# Patient Record
Sex: FEMALE | Race: WHITE | NOT HISPANIC OR LATINO | Employment: FULL TIME | URBAN - METROPOLITAN AREA
[De-identification: names, ages, dates, MRNs, and addresses within clinical notes are randomized per-mention and may not be internally consistent; named-entity substitution may affect disease eponyms.]

---

## 2024-07-19 ENCOUNTER — TELEPHONE (OUTPATIENT)
Dept: FAMILY MEDICINE | Facility: CLINIC | Age: 19
End: 2024-07-19
Payer: COMMERCIAL

## 2024-07-19 NOTE — TELEPHONE ENCOUNTER
Pt had had a toenail removed back December, the nail is growing back and is again ingrown and is looking infected on the right side of nail, she was asking if urgent care could address this. Gave her the name of the [place] urgent care and advised she set up with a PCP and also seek referral from them for podiatry if needed. She agreed    Alondra Oswald RN   [facility]

## 2024-08-10 ENCOUNTER — OFFICE VISIT (OUTPATIENT)
Dept: URGENT CARE | Facility: URGENT CARE | Age: 19
End: 2024-08-10
Payer: COMMERCIAL

## 2024-08-10 VITALS
SYSTOLIC BLOOD PRESSURE: 120 MMHG | TEMPERATURE: 98.1 F | RESPIRATION RATE: 16 BRPM | DIASTOLIC BLOOD PRESSURE: 68 MMHG | OXYGEN SATURATION: 100 % | HEART RATE: 69 BPM | WEIGHT: 139.4 LBS

## 2024-08-10 DIAGNOSIS — L60.0 INGROWN TOENAIL OF RIGHT FOOT: Primary | ICD-10-CM

## 2024-08-10 PROCEDURE — 99203 OFFICE O/P NEW LOW 30 MIN: CPT | Performed by: FAMILY MEDICINE

## 2024-08-10 RX ORDER — TRETINOIN 0.25 MG/G
CREAM TOPICAL
COMMUNITY
Start: 2023-06-02

## 2024-08-10 RX ORDER — CEPHALEXIN 500 MG/1
500 CAPSULE ORAL 3 TIMES DAILY
Qty: 21 CAPSULE | Refills: 0 | Status: SHIPPED | OUTPATIENT
Start: 2024-08-10 | End: 2024-08-17

## 2024-08-10 RX ORDER — ESCITALOPRAM OXALATE 20 MG/1
20 TABLET ORAL
COMMUNITY
Start: 2024-06-18 | End: 2024-09-19

## 2024-08-10 NOTE — PATIENT INSTRUCTIONS
Continue the Epsom foot soaks.      Take Tylenol for some of the right big toe pain.      Follow up with a podiatrist for further evaluation and treatment.

## 2024-08-10 NOTE — PROGRESS NOTES
SUBJECTIVE:   Hoa Frost is a 18 year old female presenting with a chief complaint of one month of redness, tenderness, yellowish watery oozing at the lateral and distal aspects of the right big toe.    Onset of symptoms was one month ago.  No new footwear. No recent injuries to the right big toe.  .    Current and Associated symptoms: No fevers. No spreading red streaks.    Treatment measures tried include Epsom salt soaks.  She also has applied Triple Antibiotic cream.  ..    Past Medical History:    Patient had an ingrown toenail on the medial side of the right big toe.      Current Outpatient Medications   Medication Sig Dispense Refill    escitalopram (LEXAPRO) 20 MG tablet Take 20 mg by mouth      levonorgestrel (MIRENA) 52 MG (20 mcg/day) IUD by Intrauterine route once      tretinoin (RETIN-A) 0.025 % external cream Apply pea sized amount every 3rd night, then increase to nightly as tolerated.       Social History     Tobacco Use    Smoking status: Not on file    Smokeless tobacco: Not on file   Substance Use Topics    Alcohol use: Not on file     Patient works as a  and is on her feet a lot.      ROS:  CONSTITUTIONAL:negative for fevers.    MUSCULOSKELETAL:  positive for pain, redness at the right big toe.      OBJECTIVE:  /68   Pulse 69   Temp 98.1  F (36.7  C)   Resp 16   Wt 63.2 kg (139 lb 6.4 oz)   SpO2 100%   GENERAL APPEARANCE: healthy, alert and no distress  Extremities: right great toe:  there is erythema, edema, tenderness over the lateral and distal aspects of the toe.  No red streaks are present.  No discharge was visible.      ASSESSMENT:  Ingrown Toenail of the right great toe.      PLAN:  Rx:  Cephalexin  I ordered a podiatry  referral for the patient.   Continue the Epsom foot soaks  Tylenol for the pain.   See orders in Epic    Sg Garcia MD

## 2024-08-19 ENCOUNTER — OFFICE VISIT (OUTPATIENT)
Dept: PODIATRY | Facility: CLINIC | Age: 19
End: 2024-08-19
Attending: FAMILY MEDICINE
Payer: COMMERCIAL

## 2024-08-19 VITALS
DIASTOLIC BLOOD PRESSURE: 83 MMHG | SYSTOLIC BLOOD PRESSURE: 124 MMHG | HEART RATE: 80 BPM | BODY MASS INDEX: 19.9 KG/M2 | HEIGHT: 70 IN | WEIGHT: 139 LBS

## 2024-08-19 DIAGNOSIS — L60.0 INGROWN TOENAIL OF RIGHT FOOT: ICD-10-CM

## 2024-08-19 PROCEDURE — 11750 EXCISION NAIL&NAIL MATRIX: CPT | Mod: T5 | Performed by: PODIATRIST

## 2024-08-19 PROCEDURE — 99203 OFFICE O/P NEW LOW 30 MIN: CPT | Mod: 25 | Performed by: PODIATRIST

## 2024-08-19 NOTE — PROGRESS NOTES
"Hoa Frost is a 18 year old female who presents with a chief complaint of a painful ingrown toenail to the right great toe.  The patient relates pain when wearing shoes.  The patient denies any redness extending up the big toe into the foot.  The patient relates the condition has been getting worse over the past several weeks.         Pertinent medical, surgical and family history was reviewed in the chart.    Vitals: /83   Pulse 80   Ht 1.778 m (5' 10\")   Wt 63 kg (139 lb)   BMI 19.94 kg/m    BMI= Body mass index is 19.94 kg/m .    LOWER EXTREMITY PHYSICAL EXAM    Dermatologic: One notes an inflamed nail border of the right great toe.  There is noted erythema and edema located around the labial fold and does not extend past the interphalangeal joint.  There is no apparent purulent drainage noted.  There is pain on palpation to the proximal aspect of the nail border.  Otherwise, the skin is intact to both lower extremities without significant lesions, rash or abrasion.           Vascular: DP & PT pulses are intact & regular on the right.   CFT and skin temperature is normal to the right lower extremities.     Neurologic: Lower extremity sensation is intact to light touch.  No evidence of weakness in the right lower extremities.        Musculoskeletal: Patient is ambulatory without assistive device or brace.  No gross ankle deformity noted.  No foot or ankle joint effusion is noted.         ASSESSMENT / PLAN:     ICD-10-CM    1. Ingrown toenail of right foot  L60.0 Orthopedic  Referral     REMOVAL NAIL/NAIL BED, PARTIAL OR COMPLETE          Plan:  I have explained to Hoa about the condition.  The potential causes and nature of an ingrown toenail were discussed with the patient.  We reviewed the natural history and prognosis of the condition and potential risks if no treatment is provided.  Treatment options discussed included conservative management (oral antibiotics, soaking of foot, " adequate width shoes)  as well as surgical management (partial or total nail removal).  The pros and cons of both forms as well as risks and benefits of treatment were reviewed.       At this point, I recommended having the offending nail border permanently removed from the right great toe.  I have explained to the patient all of the possible risks, benefits, alternatives to the procedure.  The patient consented to the proposed procedure.  The right hallux was swabbed with alcohol.  Next, approximately 3 cc of 1% lidocaine plain was injected around the right hallux.  The right hallux was then prepped with Betadine ointment.  A tourniquet was applied to the right hallux.  A Thornton elevator was utilized to free up the eponychium of the offending nail border.  Next, the offending nail border was split using an English anvil back to the matrix.  Next, utilizing a straight hemostat, the offending nail border was avulsed in toto.  The wound bed was debrided on any remaining nail and hyperkeratotic skin.  Next, the offending nail matrix was treated with 89% phenol using microtip cotton applicators for 30 seconds.  The wound was then irrigated and dressed with bacitracin antibiotic ointment and a compressive  sterile dressing.  The tourniquet was removed and a prompt hyperemic response was noted.  The patient tolerated the procedure well with no complications.  The patient was given post procedure instructions for the care of the wound.  The patient was informed that it is common to experience redness with watery drainage coming from the treated areas of the toe related to the phenol application.  The patient may return for reevaluation and was instructed to notify the office if any redness extending past the big toe joint or fever with chills are experienced before then.      There is low risk of morbidity with the procedure.  There was no overlap in work associated with the evaluation/management and the work associated  with the procedure.    Hoa verbalized agreement with and understanding of the rational for the diagnosis and treatment plan.  All questions were answered to best of my ability and the patient's satisfaction. The patient was advised to contact the clinic with any questions that may arise after the clinic visit.      Disclaimer: This note consists of symbols derived from keyboarding, dictation and/or voice recognition software. As a result, there may be errors in the script that have gone undetected. Please consider this when interpreting information found in this chart.      YANET Zepeda D.P.M., F.JOSH.TOR.F.A.S.

## 2024-08-19 NOTE — NURSING NOTE
"Chief Complaint   Patient presents with    Consult     Great right       Initial /83   Pulse 80   Ht 1.778 m (5' 10\")   Wt 63 kg (139 lb)   BMI 19.94 kg/m   Estimated body mass index is 19.94 kg/m  as calculated from the following:    Height as of this encounter: 1.778 m (5' 10\").    Weight as of this encounter: 63 kg (139 lb).  Medications and allergies reviewed.      Soha MEYER MA    "

## 2024-08-19 NOTE — LETTER
"8/19/2024      Hoa Frost  [address]  [place] [place] [address]      Dear Colleague,    Thank you for referring your patient, oHa Frost, to the [facility]. Please see a copy of my visit note below.    Hoa Frost is a 18 year old female who presents with a chief complaint of a painful ingrown toenail to the right great toe.  The patient relates pain when wearing shoes.  The patient denies any redness extending up the big toe into the foot.  The patient relates the condition has been getting worse over the past several weeks.         Pertinent medical, surgical and family history was reviewed in the chart.    Vitals: /83   Pulse 80   Ht 1.778 m (5' 10\")   Wt 63 kg (139 lb)   BMI 19.94 kg/m    BMI= Body mass index is 19.94 kg/m .    LOWER EXTREMITY PHYSICAL EXAM    Dermatologic: One notes an inflamed nail border of the right great toe.  There is noted erythema and edema located around the labial fold and does not extend past the interphalangeal joint.  There is no apparent purulent drainage noted.  There is pain on palpation to the proximal aspect of the nail border.  Otherwise, the skin is intact to both lower extremities without significant lesions, rash or abrasion.           Vascular: DP & PT pulses are intact & regular on the right.   CFT and skin temperature is normal to the right lower extremities.     Neurologic: Lower extremity sensation is intact to light touch.  No evidence of weakness in the right lower extremities.        Musculoskeletal: Patient is ambulatory without assistive device or brace.  No gross ankle deformity noted.  No foot or ankle joint effusion is noted.         ASSESSMENT / PLAN:     ICD-10-CM    1. Ingrown toenail of right foot  L60.0 Orthopedic  Referral     REMOVAL NAIL/NAIL BED, PARTIAL OR COMPLETE          Plan:  I have explained to Hoa about the condition.  The potential causes and nature " of an ingrown toenail were discussed with the patient.  We reviewed the natural history and prognosis of the condition and potential risks if no treatment is provided.  Treatment options discussed included conservative management (oral antibiotics, soaking of foot, adequate width shoes)  as well as surgical management (partial or total nail removal).  The pros and cons of both forms as well as risks and benefits of treatment were reviewed.       At this point, I recommended having the offending nail border permanently removed from the right great toe.  I have explained to the patient all of the possible risks, benefits, alternatives to the procedure.  The patient consented to the proposed procedure.  The right hallux was swabbed with alcohol.  Next, approximately 3 cc of 1% lidocaine plain was injected around the right hallux.  The right hallux was then prepped with Betadine ointment.  A tourniquet was applied to the right hallux.  A Butler elevator was utilized to free up the eponychium of the offending nail border.  Next, the offending nail border was split using an English anvil back to the matrix.  Next, utilizing a straight hemostat, the offending nail border was avulsed in toto.  The wound bed was debrided on any remaining nail and hyperkeratotic skin.  Next, the offending nail matrix was treated with 89% phenol using microtip cotton applicators for 30 seconds.  The wound was then irrigated and dressed with bacitracin antibiotic ointment and a compressive  sterile dressing.  The tourniquet was removed and a prompt hyperemic response was noted.  The patient tolerated the procedure well with no complications.  The patient was given post procedure instructions for the care of the wound.  The patient was informed that it is common to experience redness with watery drainage coming from the treated areas of the toe related to the phenol application.  The patient may return for reevaluation and was instructed to  notify the office if any redness extending past the big toe joint or fever with chills are experienced before then.      There is low risk of morbidity with the procedure.  There was no overlap in work associated with the evaluation/management and the work associated with the procedure.    Hoa verbalized agreement with and understanding of the rational for the diagnosis and treatment plan.  All questions were answered to best of my ability and the patient's satisfaction. The patient was advised to contact the clinic with any questions that may arise after the clinic visit.      Disclaimer: This note consists of symbols derived from keyboarding, dictation and/or voice recognition software. As a result, there may be errors in the script that have gone undetected. Please consider this when interpreting information found in this chart.      MITCH Braxton.P.REMY., F.A.C.F.A.S.      Again, thank you for allowing me to participate in the care of your patient.        Sincerely,        Yovany Zepeda DPM

## 2024-10-06 ENCOUNTER — HEALTH MAINTENANCE LETTER (OUTPATIENT)
Age: 19
End: 2024-10-06

## 2025-03-10 ENCOUNTER — LAB (OUTPATIENT)
Dept: LAB | Facility: CLINIC | Age: 20
End: 2025-03-10
Payer: COMMERCIAL

## 2025-03-10 ENCOUNTER — OFFICE VISIT (OUTPATIENT)
Dept: INTERNAL MEDICINE | Facility: CLINIC | Age: 20
End: 2025-03-10
Payer: COMMERCIAL

## 2025-03-10 VITALS
OXYGEN SATURATION: 95 % | HEIGHT: 70 IN | HEART RATE: 63 BPM | TEMPERATURE: 98.2 F | SYSTOLIC BLOOD PRESSURE: 128 MMHG | BODY MASS INDEX: 19.23 KG/M2 | RESPIRATION RATE: 16 BRPM | DIASTOLIC BLOOD PRESSURE: 79 MMHG | WEIGHT: 134.3 LBS

## 2025-03-10 DIAGNOSIS — G43.709 CHRONIC MIGRAINE WITHOUT AURA WITHOUT STATUS MIGRAINOSUS, NOT INTRACTABLE: ICD-10-CM

## 2025-03-10 DIAGNOSIS — Z11.3 SCREEN FOR SEXUALLY TRANSMITTED DISEASES: ICD-10-CM

## 2025-03-10 DIAGNOSIS — Z12.4 CERVICAL CANCER SCREENING: Primary | ICD-10-CM

## 2025-03-10 DIAGNOSIS — F33.42 RECURRENT MAJOR DEPRESSIVE DISORDER, IN FULL REMISSION: ICD-10-CM

## 2025-03-10 LAB
ALBUMIN SERPL BCG-MCNC: 5 G/DL (ref 3.5–5.2)
ALP SERPL-CCNC: 76 U/L (ref 40–150)
ALT SERPL W P-5'-P-CCNC: 17 U/L (ref 0–50)
ANION GAP SERPL CALCULATED.3IONS-SCNC: 11 MMOL/L (ref 7–15)
AST SERPL W P-5'-P-CCNC: 18 U/L (ref 0–45)
BILIRUB SERPL-MCNC: 0.5 MG/DL
BUN SERPL-MCNC: 5.8 MG/DL (ref 6–20)
CALCIUM SERPL-MCNC: 9.7 MG/DL (ref 8.8–10.4)
CHLORIDE SERPL-SCNC: 106 MMOL/L (ref 98–107)
CREAT SERPL-MCNC: 0.76 MG/DL (ref 0.51–0.95)
EGFRCR SERPLBLD CKD-EPI 2021: >90 ML/MIN/1.73M2
ERYTHROCYTE [DISTWIDTH] IN BLOOD BY AUTOMATED COUNT: 13 % (ref 10–15)
GLUCOSE SERPL-MCNC: 98 MG/DL (ref 70–99)
HCO3 SERPL-SCNC: 24 MMOL/L (ref 22–29)
HCT VFR BLD AUTO: 40.5 % (ref 35–47)
HGB BLD-MCNC: 14 G/DL (ref 11.7–15.7)
HIV 1+2 AB+HIV1 P24 AG SERPL QL IA: NONREACTIVE
MCH RBC QN AUTO: 30.3 PG (ref 26.5–33)
MCHC RBC AUTO-ENTMCNC: 34.6 G/DL (ref 31.5–36.5)
MCV RBC AUTO: 88 FL (ref 78–100)
PLATELET # BLD AUTO: 257 10E3/UL (ref 150–450)
POTASSIUM SERPL-SCNC: 3.7 MMOL/L (ref 3.4–5.3)
PROT SERPL-MCNC: 8 G/DL (ref 6.4–8.3)
RBC # BLD AUTO: 4.62 10E6/UL (ref 3.8–5.2)
SODIUM SERPL-SCNC: 141 MMOL/L (ref 135–145)
T PALLIDUM AB SER QL: NONREACTIVE
T4 FREE SERPL-MCNC: 1.25 NG/DL (ref 0.9–1.7)
TSH SERPL DL<=0.005 MIU/L-ACNC: 0.15 UIU/ML (ref 0.3–4.2)
WBC # BLD AUTO: 5.4 10E3/UL (ref 4–11)

## 2025-03-10 PROCEDURE — 87591 N.GONORRHOEAE DNA AMP PROB: CPT | Performed by: INTERNAL MEDICINE

## 2025-03-10 PROCEDURE — 86780 TREPONEMA PALLIDUM: CPT | Performed by: INTERNAL MEDICINE

## 2025-03-10 PROCEDURE — 99000 SPECIMEN HANDLING OFFICE-LAB: CPT | Performed by: PATHOLOGY

## 2025-03-10 PROCEDURE — 85027 COMPLETE CBC AUTOMATED: CPT | Performed by: PATHOLOGY

## 2025-03-10 PROCEDURE — 36415 COLL VENOUS BLD VENIPUNCTURE: CPT | Performed by: PATHOLOGY

## 2025-03-10 PROCEDURE — 84443 ASSAY THYROID STIM HORMONE: CPT | Performed by: PATHOLOGY

## 2025-03-10 PROCEDURE — 80053 COMPREHEN METABOLIC PANEL: CPT | Performed by: PATHOLOGY

## 2025-03-10 PROCEDURE — 84439 ASSAY OF FREE THYROXINE: CPT | Performed by: PATHOLOGY

## 2025-03-10 PROCEDURE — 87491 CHLMYD TRACH DNA AMP PROBE: CPT | Performed by: INTERNAL MEDICINE

## 2025-03-10 PROCEDURE — 87389 HIV-1 AG W/HIV-1&-2 AB AG IA: CPT | Performed by: INTERNAL MEDICINE

## 2025-03-10 RX ORDER — ESCITALOPRAM OXALATE 20 MG/1
20 TABLET ORAL DAILY
Qty: 30 TABLET | Refills: 5 | Status: SHIPPED | OUTPATIENT
Start: 2025-03-10

## 2025-03-10 RX ORDER — AMITRIPTYLINE HYDROCHLORIDE 10 MG/1
10 TABLET ORAL AT BEDTIME
Qty: 30 TABLET | Refills: 5 | Status: SHIPPED | OUTPATIENT
Start: 2025-03-10

## 2025-03-10 RX ORDER — MULTIVITAMIN WITH IRON
1 TABLET ORAL DAILY
Qty: 90 TABLET | Refills: 3 | Status: SHIPPED | OUTPATIENT
Start: 2025-03-10

## 2025-03-10 SDOH — HEALTH STABILITY: PHYSICAL HEALTH
ON AVERAGE, HOW MANY DAYS PER WEEK DO YOU ENGAGE IN MODERATE TO STRENUOUS EXERCISE (LIKE A BRISK WALK)?: PATIENT DECLINED

## 2025-03-10 SDOH — HEALTH STABILITY: PHYSICAL HEALTH: ON AVERAGE, HOW MANY MINUTES DO YOU ENGAGE IN EXERCISE AT THIS LEVEL?: PATIENT DECLINED

## 2025-03-10 ASSESSMENT — ANXIETY QUESTIONNAIRES
6. BECOMING EASILY ANNOYED OR IRRITABLE: SEVERAL DAYS
GAD7 TOTAL SCORE: 11
7. FEELING AFRAID AS IF SOMETHING AWFUL MIGHT HAPPEN: NOT AT ALL
4. TROUBLE RELAXING: NEARLY EVERY DAY
2. NOT BEING ABLE TO STOP OR CONTROL WORRYING: NEARLY EVERY DAY
GAD7 TOTAL SCORE: 11
1. FEELING NERVOUS, ANXIOUS, OR ON EDGE: SEVERAL DAYS
GAD7 TOTAL SCORE: 11
8. IF YOU CHECKED OFF ANY PROBLEMS, HOW DIFFICULT HAVE THESE MADE IT FOR YOU TO DO YOUR WORK, TAKE CARE OF THINGS AT HOME, OR GET ALONG WITH OTHER PEOPLE?: NOT DIFFICULT AT ALL
7. FEELING AFRAID AS IF SOMETHING AWFUL MIGHT HAPPEN: NOT AT ALL
5. BEING SO RESTLESS THAT IT IS HARD TO SIT STILL: MORE THAN HALF THE DAYS
IF YOU CHECKED OFF ANY PROBLEMS ON THIS QUESTIONNAIRE, HOW DIFFICULT HAVE THESE PROBLEMS MADE IT FOR YOU TO DO YOUR WORK, TAKE CARE OF THINGS AT HOME, OR GET ALONG WITH OTHER PEOPLE: NOT DIFFICULT AT ALL
3. WORRYING TOO MUCH ABOUT DIFFERENT THINGS: SEVERAL DAYS

## 2025-03-10 ASSESSMENT — PATIENT HEALTH QUESTIONNAIRE - PHQ9
SUM OF ALL RESPONSES TO PHQ QUESTIONS 1-9: 18
SUM OF ALL RESPONSES TO PHQ QUESTIONS 1-9: 18
10. IF YOU CHECKED OFF ANY PROBLEMS, HOW DIFFICULT HAVE THESE PROBLEMS MADE IT FOR YOU TO DO YOUR WORK, TAKE CARE OF THINGS AT HOME, OR GET ALONG WITH OTHER PEOPLE: VERY DIFFICULT

## 2025-03-10 ASSESSMENT — SOCIAL DETERMINANTS OF HEALTH (SDOH): HOW OFTEN DO YOU GET TOGETHER WITH FRIENDS OR RELATIVES?: TWICE A WEEK

## 2025-03-10 NOTE — PROGRESS NOTES
"  {PROVIDER CHARTING PREFERENCE:529702}    Abner Camara is a 19 year old, presenting for the following health issues:  Establish Care and Refill Request      3/10/2025     1:02 PM   Additional Questions   Roomed by Elsie HILL   Accompanied by N/A     History of Present Illness       Mental Health Follow-up:  Patient presents to follow-up on Depression & Anxiety.Patient's depression since last visit has been:  Better  The patient is having other symptoms associated with depression.  Patient's anxiety since last visit has been:  Better  The patient is having other symptoms associated with anxiety.  Any significant life events: relationship concerns  Patient is feeling anxious or having panic attacks.  Patient has no concerns about alcohol or drug use.    Headaches:   Since the patient's last clinic visit, headaches are: worsened  The patient is getting headaches:  Headaches almost everyday  She is not able to do normal daily activities when she has a migraine.  The patient is taking the following rescue/relief medications:  Ibuprofen (Advil, Motrin) and sumatriptan (Imitrex)   Patient states \"The relief is inconsistent\" from the rescue/relief medications.   The patient is taking the following medications to prevent migraines:  No medications to prevent migraines  In the past 4 weeks, the patient has gone to an Urgent Care or Emergency Room 0 times times due to headaches.She exercises with enough effort to increase her heart rate 9 or less minutes per day.  She exercises with enough effort to increase her heart rate 5 days per week. She is missing 2 dose(s) of medications per week.  She is not taking prescribed medications regularly due to remembering to take.        Patient is here to establish care. She reports that she needs a refill for Lexapro. She also reports that she has noticed increase in frequency of migraines. She gets vilma and some neurological symptoms with migraines. She used to be on a daily " "medication for headaches, however, she is not taking it currently.   Patient is currently working with a therapist. She has been in counseling every week, recently switched to less frequent sessions.     PATIENT HEALTH QUESTIONNAIRE-9 (PHQ - 9)    Over the last 2 weeks, how often have you been bothered by any of the following problems?    1. Little interest or pleasure in doing things -  (Patient-Rptd) Nearly every day   2. Feeling down, depressed, or hopeless -  (Patient-Rptd) Nearly every day   3. Trouble falling or staying asleep, or sleeping too much - (Patient-Rptd) More than half the days   4. Feeling tired or having little energy -  (Patient-Rptd) Several days   5. Poor appetite or overeating -  (Patient-Rptd) Nearly every day   6. Feeling bad about yourself - or that you are a failure or have let yourself or your family down -  (Patient-Rptd) Several days   7. Trouble concentrating on things, such as reading the newspaper or watching television - (Patient-Rptd) Nearly every day   8. Moving or speaking so slowly that other people could have noticed? Or the opposite - being so fidgety or restless that you have been moving around a lot more than usual (Patient-Rptd) More than half the days   9. Thoughts that you would be better off dead or of hurting  yourself in some way (Patient-Rptd) Not at all   Total Score: (Patient-Rptd) 18         Objective    /79 (BP Location: Right arm, Patient Position: Sitting, Cuff Size: Adult Small)   Pulse 63   Temp 98.2  F (36.8  C)   Resp 16   Ht 1.778 m (5' 10\")   Wt 60.9 kg (134 lb 4.8 oz)   SpO2 95%   BMI 19.27 kg/m    Body mass index is 19.27 kg/m .  Physical Exam   {Exam List (Optional):069309}    {Diagnostic Test Results (Optional):183277}        Signed Electronically by: Lisa Oseguera MD  {Email feedback regarding this note to [email or web address]   :[id number]}  "

## 2025-03-11 LAB
C TRACH DNA SPEC QL NAA+PROBE: NEGATIVE
N GONORRHOEA DNA SPEC QL NAA+PROBE: NEGATIVE
SPECIMEN TYPE: NORMAL
SPECIMEN TYPE: NORMAL

## 2025-03-21 ENCOUNTER — TELEPHONE (OUTPATIENT)
Dept: INTERNAL MEDICINE | Facility: CLINIC | Age: 20
End: 2025-03-21

## 2025-03-21 DIAGNOSIS — G43.709 CHRONIC MIGRAINE WITHOUT AURA WITHOUT STATUS MIGRAINOSUS, NOT INTRACTABLE: Primary | ICD-10-CM

## 2025-03-24 NOTE — TELEPHONE ENCOUNTER
PRIOR AUTHORIZATION DENIED    Medication: NURTEC 75 MG PO TBDP  Insurance Company: [employer] - Phone [phone] Fax [phone]  Denial Date: 3/24/2025    Denial Reason(s):     Appeal Information: If provider would like to appeal please provide a letter of medical necessity.  Please note: Pt is required to sign the appeal form for provider/ PA Team to initiate appeal on the pt's behalf.

## 2025-03-24 NOTE — TELEPHONE ENCOUNTER
Central Prior Authorization Team  Phone: [phone]    PA Initiation    Medication: NURTEC 75 MG PO TBDP  Insurance Company: [employer] - Phone [phone] Fax [phone]  Pharmacy Filling the Rx: [facility] [place], [place] - [address] [place] [address] [id number]  Filling Pharmacy Phone: [phone]  Filling Pharmacy Fax:    Start Date: 3/24/2025

## 2025-03-25 ENCOUNTER — TELEPHONE (OUTPATIENT)
Dept: INTERNAL MEDICINE | Facility: CLINIC | Age: 20
End: 2025-03-25

## 2025-03-25 DIAGNOSIS — G43.709 CHRONIC MIGRAINE WITHOUT AURA WITHOUT STATUS MIGRAINOSUS, NOT INTRACTABLE: Primary | ICD-10-CM

## 2025-03-25 NOTE — TELEPHONE ENCOUNTER
Retail Pharmacy Prior Authorization Team   Phone: [phone]      [facility] KEY:  (Key: [id number])    PA Initiation    Medication: UBRELVY 50 MG PO TABS  Insurance Company: [employer] - Phone [phone] Fax [phone]  Pharmacy Filling the Rx: [facility] [place], [place] - [address] [place] [address] [phone]  Filling Pharmacy Phone:    Filling Pharmacy Fax:    Start Date: 3/25/2025

## 2025-03-25 NOTE — TELEPHONE ENCOUNTER
[facility] Prior Authorization Team Request    Medication:  Ubrelvy 50mg  Dosing: Take 1 tablet by mouth at the onset of headache second dose may be taken at least 2 hours after the initial dose  NDC (required for Medicaid members): [address]-86  Insurance Company: [employer]   BIN: [id number]  PCN: [id number]  Grp: [id number]  ID: [id number]  CoverMyMeds Key (if applicable):   Additional documentation:   Pharmacy Filling the Rx:  [facility]  Filling Pharmacy Phone:  [phone]  Contact: [facility] [employer] PA (P [id number]) please send all responses to this pool.  Pharmacy NPI (required for Medicaid members):[id number]

## 2025-03-26 NOTE — TELEPHONE ENCOUNTER
Retail Pharmacy Prior Authorization Team   Phone: [phone]      PRIOR AUTHORIZATION DENIED    Medication: UBRELVY 50 MG PO TABS  Insurance Company: [employer] - Phone [phone] Fax [phone]  Denial Date: 3/25/2025  Denial Reason(s):       Appeal Information: If provider would like to appeal please provide a letter of medical necessity. Please note: Pt is required to sign the appeal form for provider/ PA Team to initiate appeal on the pt's behalf.    Patient Notified: No. The [facility] PA Team does not notify of the denial outcomes as the patient often will ask what their provider will prescribe in place of the denied medication, or additional information in regards to other therapies they can take in place of the denied medication.  This is not something we can advise on in our department.

## 2025-04-23 ENCOUNTER — PATIENT OUTREACH (OUTPATIENT)
Dept: CARE COORDINATION | Facility: CLINIC | Age: 20
End: 2025-04-23
Payer: COMMERCIAL

## 2025-06-10 ENCOUNTER — LAB (OUTPATIENT)
Dept: LAB | Facility: CLINIC | Age: 20
End: 2025-06-10
Payer: COMMERCIAL

## 2025-06-10 ENCOUNTER — MYC REFILL (OUTPATIENT)
Dept: INTERNAL MEDICINE | Facility: CLINIC | Age: 20
End: 2025-06-10

## 2025-06-10 DIAGNOSIS — G43.709 CHRONIC MIGRAINE WITHOUT AURA WITHOUT STATUS MIGRAINOSUS, NOT INTRACTABLE: ICD-10-CM

## 2025-06-10 DIAGNOSIS — R79.89 ABNORMAL TSH: ICD-10-CM

## 2025-06-10 LAB
T3 SERPL-MCNC: 102 NG/DL (ref 85–202)
T3FREE SERPL-MCNC: 3.3 PG/ML (ref 2–4.4)
TSH SERPL DL<=0.005 MIU/L-ACNC: 0.67 UIU/ML (ref 0.3–4.2)

## 2025-06-10 PROCEDURE — 86376 MICROSOMAL ANTIBODY EACH: CPT | Performed by: INTERNAL MEDICINE

## 2025-06-10 PROCEDURE — 84445 ASSAY OF TSI GLOBULIN: CPT | Mod: 90 | Performed by: PATHOLOGY

## 2025-06-10 PROCEDURE — 99000 SPECIMEN HANDLING OFFICE-LAB: CPT | Performed by: PATHOLOGY

## 2025-06-10 PROCEDURE — 84480 ASSAY TRIIODOTHYRONINE (T3): CPT | Performed by: INTERNAL MEDICINE

## 2025-06-10 PROCEDURE — 84443 ASSAY THYROID STIM HORMONE: CPT | Performed by: PATHOLOGY

## 2025-06-10 PROCEDURE — 84481 FREE ASSAY (FT-3): CPT | Performed by: INTERNAL MEDICINE

## 2025-06-10 PROCEDURE — 36415 COLL VENOUS BLD VENIPUNCTURE: CPT | Performed by: PATHOLOGY

## 2025-06-10 RX ORDER — AMITRIPTYLINE HYDROCHLORIDE 10 MG/1
10 TABLET ORAL AT BEDTIME
Qty: 30 TABLET | Refills: 5 | Status: CANCELLED | OUTPATIENT
Start: 2025-06-10

## 2025-06-11 LAB — THYROPEROXIDASE AB SERPL-ACNC: <10 IU/ML

## 2025-06-11 NOTE — TELEPHONE ENCOUNTER
Last Written Prescription:  amitriptyline (ELAVIL) 10 MG tablet 30 tablet 5 3/10/2025 -- No   Sig - Route: Take 1 tablet (10 mg) by mouth at bedtime. - Oral     Rx available      Request from pharmacy:  Requested Prescriptions   Pending Prescriptions Disp Refills    amitriptyline (ELAVIL) 10 MG tablet 30 tablet 5     Sig: Take 1 tablet (10 mg) by mouth at bedtime.       Tricyclic Agents Passed - 6/11/2025  3:22 PM        Passed - Medication is active on med list and the sig matches. RN to manually verify dose and sig if red X/fail.     If the protocol passes (green check), you do not need to verify med dose and sig.    A prescription matches if they are the same clinical intention.    For Example: once daily and every morning are the same.    The protocol can not identify upper and lower case letters as matching and will fail.     For Example: Take 1 tablet (50 mg) by mouth daily     TAKE 1 TABLET (50 MG) BY MOUTH DAILY    For all fails (red x), verify dose and sig.    If the refill does match what is on file, the RN can still proceed to approve the refill request.       If they do not match, route to the appropriate provider.             Passed - Recent (12 month) or future (90 days) visit with authorizing provider's specialty (provided they have been seen in the past 15 months)     The patient must have completed an in-person or virtual visit within the past 12 months or has a future visit scheduled within the next 90 days with the authorizing provider s specialty.  Urgent care and e-visits do not qualify as an office visit for this protocol.          Passed - Medicaiton indicated for associated diagnosis     Medication is associated with one or more of the following diagnoses:    Alcoholism   Anxiety   Attention deficit hyperactivity disorder   Depression   Fibromyalgia   Headache    Insomnia    Neurogenic bladder   Neuropathy   Nocturnal enuresis   Pain   Panic Disorder   Smoking cessation   Tinnitus           Passed - Patient is age 18 or older        Passed - Patient is not pregnant        Passed - No positive pregnancy test on record in past 12 mos

## 2025-06-13 ENCOUNTER — RESULTS FOLLOW-UP (OUTPATIENT)
Dept: INTERNAL MEDICINE | Facility: CLINIC | Age: 20
End: 2025-06-13

## 2025-06-17 LAB — TSI SER-ACNC: <1 TSI INDEX
